# Patient Record
Sex: FEMALE | Race: WHITE | NOT HISPANIC OR LATINO | URBAN - METROPOLITAN AREA
[De-identification: names, ages, dates, MRNs, and addresses within clinical notes are randomized per-mention and may not be internally consistent; named-entity substitution may affect disease eponyms.]

---

## 2022-06-27 ENCOUNTER — NEW PATIENT (OUTPATIENT)
Dept: URBAN - METROPOLITAN AREA CLINIC 17 | Facility: CLINIC | Age: 67
End: 2022-06-27

## 2022-06-27 DIAGNOSIS — H40.013: ICD-10-CM

## 2022-06-27 DIAGNOSIS — H25.13: ICD-10-CM

## 2022-06-27 PROCEDURE — 99204 OFFICE O/P NEW MOD 45 MIN: CPT

## 2022-06-27 PROCEDURE — 92133 CPTRZD OPH DX IMG PST SGM ON: CPT

## 2022-06-27 PROCEDURE — 92015 DETERMINE REFRACTIVE STATE: CPT

## 2022-06-27 ASSESSMENT — VISUAL ACUITY
OD_SC: 20/40-2
OS_GLARE: 20/400
OS_PH: 20/40-2
OD_GLARE: 20/400
OS_SC: 20/70-1

## 2022-06-27 ASSESSMENT — TONOMETRY
OD_IOP_MMHG: 16
OD_IOP_MMHG: 17
OS_IOP_MMHG: 23
OS_IOP_MMHG: 22

## 2022-12-19 ENCOUNTER — FOLLOW UP (OUTPATIENT)
Dept: URBAN - METROPOLITAN AREA CLINIC 17 | Facility: CLINIC | Age: 67
End: 2022-12-19

## 2022-12-19 PROCEDURE — 92136 OPHTHALMIC BIOMETRY: CPT

## 2022-12-19 PROCEDURE — 99214 OFFICE O/P EST MOD 30 MIN: CPT

## 2022-12-19 ASSESSMENT — TONOMETRY
OS_IOP_MMHG: 15
OD_IOP_MMHG: 14

## 2022-12-19 ASSESSMENT — VISUAL ACUITY
OD_GLARE: 20/400
OS_GLARE: 20/400
OD_PH: 20/25-1
OS_SC: 20/80
OD_SC: 20/60-2
OS_PH: 20/30+1

## 2023-01-10 NOTE — PATIENT DISCUSSION
Mother declined DFE.  Patient had to work after exam.  Evaluated via [address] [place] [address] (2021 and 2022).

## 2023-04-10 ENCOUNTER — POST-OP (OUTPATIENT)
Dept: URBAN - METROPOLITAN AREA CLINIC 17 | Facility: CLINIC | Age: 68
End: 2023-04-10

## 2023-04-10 DIAGNOSIS — H25.13: ICD-10-CM

## 2023-04-10 DIAGNOSIS — Z96.1: ICD-10-CM

## 2023-04-10 PROCEDURE — 99024 POSTOP FOLLOW-UP VISIT: CPT

## 2023-04-10 ASSESSMENT — TONOMETRY
OS_IOP_MMHG: 20
OD_IOP_MMHG: 20

## 2023-04-10 ASSESSMENT — VISUAL ACUITY
OS_SC: 20/20
OD_SC: 20/60
OD_PH: 20/25